# Patient Record
Sex: FEMALE | Race: WHITE | NOT HISPANIC OR LATINO | URBAN - METROPOLITAN AREA
[De-identification: names, ages, dates, MRNs, and addresses within clinical notes are randomized per-mention and may not be internally consistent; named-entity substitution may affect disease eponyms.]

---

## 2017-07-14 ENCOUNTER — OFFICE VISIT - HEALTHEAST (OUTPATIENT)
Dept: MIDWIFE SERVICES | Facility: CLINIC | Age: 42
End: 2017-07-14

## 2017-07-14 DIAGNOSIS — R10.2 PELVIC PAIN: ICD-10-CM

## 2017-07-14 DIAGNOSIS — N81.6 RECTOCELE: ICD-10-CM

## 2017-07-14 ASSESSMENT — MIFFLIN-ST. JEOR: SCORE: 1358.49

## 2017-07-16 ENCOUNTER — COMMUNICATION - HEALTHEAST (OUTPATIENT)
Dept: MIDWIFE SERVICES | Facility: CLINIC | Age: 42
End: 2017-07-16

## 2017-08-15 ENCOUNTER — HOSPITAL ENCOUNTER (OUTPATIENT)
Dept: ULTRASOUND IMAGING | Facility: HOSPITAL | Age: 42
Discharge: HOME | End: 2017-08-15
Attending: ADVANCED PRACTICE MIDWIFE

## 2017-08-15 DIAGNOSIS — R10.2 PELVIC PAIN: ICD-10-CM

## 2017-08-16 ENCOUNTER — AMBULATORY - HEALTHEAST (OUTPATIENT)
Dept: OBGYN | Facility: CLINIC | Age: 42
End: 2017-08-16

## 2017-08-16 ENCOUNTER — COMMUNICATION - HEALTHEAST (OUTPATIENT)
Dept: OBGYN | Facility: CLINIC | Age: 42
End: 2017-08-16

## 2017-08-16 DIAGNOSIS — N81.6 RECTOCELE, FEMALE: ICD-10-CM

## 2017-08-24 ENCOUNTER — COMMUNICATION - HEALTHEAST (OUTPATIENT)
Dept: ADMINISTRATIVE | Facility: CLINIC | Age: 42
End: 2017-08-24

## 2017-08-26 ENCOUNTER — AMBULATORY - HEALTHEAST (OUTPATIENT)
Dept: OBGYN | Facility: CLINIC | Age: 42
End: 2017-08-26

## 2017-08-28 ENCOUNTER — RECORDS - HEALTHEAST (OUTPATIENT)
Dept: ADMINISTRATIVE | Facility: OTHER | Age: 42
End: 2017-08-28

## 2017-11-16 ENCOUNTER — AMBULATORY - HEALTHEAST (OUTPATIENT)
Dept: MIDWIFE SERVICES | Facility: CLINIC | Age: 42
End: 2017-11-16

## 2017-11-28 ENCOUNTER — AMBULATORY - HEALTHEAST (OUTPATIENT)
Dept: CARDIOLOGY | Facility: CLINIC | Age: 42
End: 2017-11-28

## 2017-11-28 ENCOUNTER — OFFICE VISIT - HEALTHEAST (OUTPATIENT)
Dept: CARDIOLOGY | Facility: CLINIC | Age: 42
End: 2017-11-28

## 2017-11-28 ENCOUNTER — RECORDS - HEALTHEAST (OUTPATIENT)
Dept: ADMINISTRATIVE | Facility: OTHER | Age: 42
End: 2017-11-28

## 2017-11-28 DIAGNOSIS — I49.3 PVC'S (PREMATURE VENTRICULAR CONTRACTIONS): ICD-10-CM

## 2017-11-28 DIAGNOSIS — R07.2 PRECORDIAL PAIN: ICD-10-CM

## 2017-11-28 ASSESSMENT — MIFFLIN-ST. JEOR: SCORE: 1387.97

## 2017-12-04 ENCOUNTER — HOSPITAL ENCOUNTER (OUTPATIENT)
Dept: CARDIOLOGY | Facility: HOSPITAL | Age: 42
Discharge: HOME | End: 2017-12-04
Attending: INTERNAL MEDICINE

## 2017-12-04 DIAGNOSIS — I49.3 PVC'S (PREMATURE VENTRICULAR CONTRACTIONS): ICD-10-CM

## 2017-12-04 DIAGNOSIS — R07.2 PRECORDIAL PAIN: ICD-10-CM

## 2017-12-04 LAB
CV STRESS MAX HR HE: 175
STRESS ECHO BASELINE BP: NORMAL MM OF HG
STRESS ECHO BASELINE HR: 91 BPM
STRESS ECHO CALCULATED PERCENT HR: 98 %
STRESS ECHO LAST STRESS BP: NORMAL MM OF HG
STRESS ECHO POST ESTIMATED WORKLOAD: 11.9 METS
STRESS ECHO POST EXERCISE DUR MIN: 10 MIN
STRESS ECHO POST EXERCISE DUR SEC: 15 SEC
STRESS ECHO TARGET HR: 152

## 2017-12-27 ENCOUNTER — OFFICE VISIT - HEALTHEAST (OUTPATIENT)
Dept: CARDIOLOGY | Facility: CLINIC | Age: 42
End: 2017-12-27

## 2017-12-27 DIAGNOSIS — I49.1 PAC (PREMATURE ATRIAL CONTRACTION): ICD-10-CM

## 2017-12-27 ASSESSMENT — MIFFLIN-ST. JEOR: SCORE: 1399.54

## 2021-05-29 ENCOUNTER — RECORDS - HEALTHEAST (OUTPATIENT)
Dept: ADMINISTRATIVE | Facility: CLINIC | Age: 46
End: 2021-05-29

## 2021-05-31 ENCOUNTER — RECORDS - HEALTHEAST (OUTPATIENT)
Dept: ADMINISTRATIVE | Facility: CLINIC | Age: 46
End: 2021-05-31

## 2021-05-31 VITALS — HEIGHT: 64 IN | WEIGHT: 159.5 LBS | BODY MASS INDEX: 27.23 KG/M2

## 2021-05-31 VITALS — WEIGHT: 166 LBS | BODY MASS INDEX: 28.34 KG/M2 | HEIGHT: 64 IN

## 2021-05-31 VITALS — WEIGHT: 166.8 LBS | BODY MASS INDEX: 27.79 KG/M2 | HEIGHT: 65 IN

## 2021-06-02 ENCOUNTER — RECORDS - HEALTHEAST (OUTPATIENT)
Dept: ADMINISTRATIVE | Facility: CLINIC | Age: 46
End: 2021-06-02

## 2021-06-11 NOTE — PROGRESS NOTES
"Subjective: Elo presents to clinic by herself today.  She reports that 2 weeks ago after having sex she experienced pelvic pain approximately 15 minutes after coitis and was unable to walk afterward.  This resolved but twinges of bilateral pelvic pain were present and radiated down both legs x 1 week.  She states she has now been sx free x 2-3 days.  She has not had intercourse since.  She denies bleeding with the pelvic pain.  She denies vaginal dryness or discharge and/or foul odor.  She and her  are in a monogamous relationship and they use natural family planning and condoms for contraception.  Her cycles are regular though she reports she had one day of red spotted bleeding in the middle of her cycle this past month.  Of note she states she can't wear a tampon due to discomfort.  She reports she feels \"they do not fit right\".  She also states she feels something in her lower vagina when she touches herself.  She does not have the sensation that something is coming out of her vagina or is in between her legs.      Objective:  /78 (Patient Site: Right Arm, Patient Position: Sitting, Cuff Size: Adult Regular)  Pulse 80  Resp 20  Ht 5' 4\" (1.626 m)  Wt 159 lb 8 oz (72.3 kg)  LMP 2017 (Exact Date)  Breastfeeding? No  BMI 27.38 kg/m2     Patient appears well and is in no apparent distress.  Alert and oriented ×3.    Pelvic exam: Vulva without lesions, erythema, or open areas.  Vaginal mucosa pink and rugated with apparent bulge of vaginal floor.  Cervix pink and smooth without polyps.  No abnormal vaginal discharge or foul odor present.    No pelvic masses or CMT on bimanual exam.     Labs from 17:  UPT-negative  Wet prep-Negative  GC/CT-In process  UA/UC-Both negative  Pelvic u/s-Patient to schedule      Assessment:  40 yo   Pelvic pain following intercourse x 1.5 weeks  Rectocele      Plan:  UPT  Wet prep  GC/CT  UA/UC  Pelvic u/s  Referral to pelvic floor PT.  Provided " the name and phone number for Beverly Perez, PT ([phone])  Referral placed for PT to [facility] in [place], [place] as well.  Patient to determine which service in more cost effective for her.   Consider referral to OB-GYN in the case that ordered labs and u/s are unable to reveal the source of her pain  Follow up with patient and complete plan of care following results of pelvic u/s and GC/CT    Total time spent with patient 30 minutes.  >50% of the time spent counseling and coordinating care.    Rich MENDEZ CNM    7/16/2017  9:36 AM      The following note was sent via [employer] to the patient on 7/16/17 to clarify the plan:      Kyle Gasca,  It was great to meet you on Friday.  Here is what we have for a plan so far:    All labs have been negative with the exception of GC/CT which takes longer to get back.  I will let you know when these results are back.      A pelvic ultrasound has been ordered for you.  Please call [phone] to schedule this appointment.    Referral to pelvic floor PT.  I provided the name and phone number for Beverly Perez, PT ([phone]) to you last Friday and also placed a referral for PT to [facility] in [place], [place] as well.  They also have pelvic floor PT available.  You can determine which service you prefer/which is covered by you plan/ which is most cost effective.     In the case that we are unable to determine the source of your pain I recommend referring you to Jania Shaver MD for further care.    Let me know if you have any questions or concerns.    Take care,  ANDREA Carpenter CNM

## 2021-06-12 NOTE — PROGRESS NOTES
Phone call returned 8/25/17: Elo states that she had a recent pelvic US on 8/15/17 and she has had some vaginal itching and irritation since that time. When asked, stated that no covering was placed on vaginal probe. Discussed she can come in for an office visit or try OTC Monistat 3 or Monistat 7. Be sure to come in for office visit if above symptoms are unimproved after Monistat use. Elo agrees with this plan.  ANDREA Duffy,PATSYM

## 2021-06-12 NOTE — PROGRESS NOTES
Orders: Pt requests order to preferred physical therapist. Completed order and routed to Clinical Assistant to fax.

## 2021-06-16 PROBLEM — R10.2 PELVIC PAIN: Status: ACTIVE | Noted: 2017-07-14

## 2021-06-16 PROBLEM — N81.6 RECTOCELE: Status: ACTIVE | Noted: 2017-07-16

## 2021-06-25 NOTE — PROGRESS NOTES
Progress Notes by Mylene Sher MD at 11/28/2017  3:30 PM     Author: Mylene Sher MD Service: -- Author Type: Physician    Filed: 11/28/2017  4:00 PM Encounter Date: 11/28/2017 Status: Signed    : Mylene Sher MD (Physician)                [facility].[email or web address]  [phone]          Thank you Dr. Leahy for asking the [facility] Heart Care team to participate in the care of your patient, Elo Grayson.     Impression and Plan     1.  Palpitations/arrhythmia.  Patient in the distant past he has been noted to have evidence of rare PVCs/PACs.  Patients description of her palpitations are consistent with PVCs/PACs.  Thyroid panel already ordered by primary provider.  Plan:    Plan to perform a two-week 1 patch monitor to further clarify rhythm disturbance.    We will obtain a stress echocardiogram particularly given her palpitations and part seemed to be exacerbated with activity and this will also allow for us to rule out any structural heart disease and also to assess for ischemia vis-à-vis her chest pain symptoms though my level of suspicion that her symptoms are ischemic is relatively low given her lack of risk factors and the like.           History of Present Illness    Once again I would like to thank you again for asking me to participate in the care of your patient, Elo Grayson.  As you know, but to reiterate for my own records, Elo Grayson is a 42 y.o. female with long history of palpitations.  Patient states over the past several months, however, she has noted an increase in her symptoms.  She describes a sensation of an irregular heartbeat with frequent skipping.  Her symptoms can sometimes varying intensity.  At times she can go a day or 2 without any symptoms though at other times it seems to be more frequent.  With the palpitations themselves, she denies any other significant associated symptoms.  She does feel as though they may be related to  "exertion, however.    In addition to the aforementioned, she has noted some discomfort in the chest with certain activities.  She recounts an episode where she was racing whenever children and noted some discomfort in her left shoulder which radiated down into her arm.  She did have her associated palpitations with this.    Further review of systems is otherwise negative/noncontributory (medical record and 13 point review of systems reviewed as well and pertinent positives noted).         Cardiac Diagnostics     Echocardiogram 27 December 2001:  1. Normal left ventricle size and systolic performance with ejection fraction of 65%.  2. No significant valvular heart disease.  3. Normal right ventricular size and systolic performance.  4. Normal atrial dimensions.    Holter monitor 27 December 2001:  1. Sinus rhythm with normal electrocardiographic intervals.  2. Average heart rate of 80 bpm.  No excessive bradycardia, tachycardia, or heart block identified.  3. Rare ectopy consisting of 4 single PVCs and 165 single PACs.  Symptoms of palpitations noted on several occasions and may correlate to singular ectopic beats, but no worrisome or repetitive ectopic beats identified.           Physical Examination       /76 (Patient Site: Right Arm, Patient Position: Sitting, Cuff Size: Adult Large)  Pulse 86  Resp 16  Ht 5' 4\" (1.626 m)  Wt 166 lb (75.3 kg) Comment: with shoes  BMI 28.49 kg/m2        Wt Readings from Last 3 Encounters:   11/28/17 166 lb (75.3 kg)   07/14/17 159 lb 8 oz (72.3 kg)   08/29/16 168 lb 3.2 oz (76.3 kg)     The patient is alert and oriented times three. Sclerae are anicteric. Mucosal membranes are moist. Jugular venous pressure is normal. No significant adenopathy/thyromegally appreciated. Lungs are clear with good expansion. On cardiovascular exam, the patient has a regular S1 and S2. Abdomen is soft and non-tender. Extremities reveal no clubbing, cyanosis, or edema.         Family " History/Social History/Risk Factors     Patient has never been a smoker.  She reports no early onset coronary disease in her immediate family.  She has 4 children and enjoys spending time with her family.         Medications  Allergies   Current Outpatient Prescriptions   Medication Sig Dispense Refill   ? cholecalciferol, vitamin D3, (VITAMIN D3) 2,000 unit cap Take 2 capsules by mouth daily.     ? COQ10, UBIQUINOL, ORAL Take by mouth.     ? DOCOSAHEXANOIC ACID/EPA (FISH OIL ORAL) CAPS     ? LACTOBACILLUS ACIDOPHILUS (PROBIOTIC ORAL) CAPS     ? MULTIVITAMIN ORAL Take by mouth.       No current facility-administered medications for this visit.       No Known Allergies       Lab Results     Lab Results   Component Value Date    WBC 7.9 06/18/2012    HGB 12.1 12/23/2012    HCT 39.3 06/18/2012    MCV 92 06/18/2012     06/18/2012     Lab Results   Component Value Date    CHOL 208 (H) 04/30/2015    TRIG 89 04/30/2015    HDL 50 04/30/2015    LDLCALC 140 (H) 04/30/2015     Lab Results   Component Value Date    TSH 1.66 03/10/2016           Medical History  Surgical History   Past Medical History:   Diagnosis Date   ? Foot fracture, left    ? Mild anxiety     no meds   ? PVC (premature ventricular contraction) 4/30/2015   ? Urinary tract infection     15 years ago   ? Varicella     age 11   ? Varicosities    ? Varicosities of leg     Right leg      Past Surgical History:   Procedure Laterality Date   ? BREAST BIOPSY Left     benign   ? WISDOM TOOTH EXTRACTION  1995   ? WISDOM TOOTH EXTRACTION  1994

## 2021-06-26 NOTE — PROGRESS NOTES
Progress Notes by Mylene Sher MD at 12/27/2017  8:50 AM     Author: Mylene Sher MD Service: -- Author Type: Physician    Filed: 12/27/2017  9:29 AM Encounter Date: 12/27/2017 Status: Signed    : Mylene Sher MD (Physician)                  [facility].[email or web address]  [phone]         Thank you Dr. Leahy for asking the [facility] Heart Care team to participate in the care of your patient, Elo Grayson.     Impression and Plan     1.   Premature atrial contractions.  Recent one patch monitor revealed evidence of premature atrial contractions.  These retching quite rare.  These did not correlate with her symptoms.  At this time, I do not feel any specific further workup or treatment is required.  I felt simple reassurance was all that is required.  Patient was actually quite pleased with our discussion today in a person reviewed her rhythm strips with her.  At this time, we will simply plan on following up on an as-needed basis.           History of Present Illness    Once again I would like to thank you again for asking me to participate in the care of your patient, Elo Grayson.  As you know, but to reiterate for my own records, Elo Grayson is a 42 y.o. female with long history of palpitations.  Patient is here today for follow-up of recent one patch monitor placement.  On interview, patient reports some continued intermittent palpitations which she describes as skipping.  She otherwise is without significant symptoms.  She specifically denies any chest pain at this point.  She reports no shortness of breath or lightheadedness.  She denies any decline in exercise tolerance.    Further review of systems is otherwise negative/noncontributory (medical record and 13 point review of systems reviewed as well and pertinent positives noted).         Cardiac Diagnostics      Echocardiogram 27 December 2001:  1. Normal left ventricle size and systolic performance  "with ejection fraction of 65%.  2. No significant valvular heart disease.  3. Normal right ventricular size and systolic performance.  4. Normal atrial dimensions.    Normal stress echocardiogram without evidence of stress induced ischemia.   1. Normal resting LV systolic performance with an ejection fraction of 55-60%. There is normal improvement in left ventricular systolic performance with a peak ejection fraction of 70-75%.  2. No ECG evidence of ischemia.  3. No anginal chest pain reported with exercise.  4. Good functional capacity for age.  5. Stress echocardiogram for December 2017 (personally reviewed):    Holter monitor 27 December 2001:  1. Sinus rhythm with normal electrocardiographic intervals.  2. Average heart rate of 80 bpm.  No excessive bradycardia, tachycardia, or heart block identified.  3. Rare ectopy consisting of 4 single PVCs and 165 single PACs.  Symptoms of palpitations noted on several occasions and may correlate to singular ectopic beats, but no worrisome or repetitive ectopic beats identified.         Physical Examination       /86 (Patient Site: Left Arm, Patient Position: Sitting, Cuff Size: Adult Regular)  Pulse 80  Resp 16  Ht 5' 4.5\" (1.638 m) Comment: shoes on  Wt 166 lb 12.8 oz (75.7 kg) Comment: shoes on  BMI 28.19 kg/m2        Wt Readings from Last 3 Encounters:   12/27/17 166 lb 12.8 oz (75.7 kg)   11/28/17 166 lb (75.3 kg)   07/14/17 159 lb 8 oz (72.3 kg)     The patient is alert and oriented times three. Sclerae are anicteric. Mucosal membranes are moist. Jugular venous pressure is normal. No significant adenopathy/thyromegally appreciated. Lungs are clear with good expansion. On cardiovascular exam, the patient has a regular S1 and S2. Abdomen is soft and non-tender. Extremities reveal no clubbing, cyanosis, or edema.         Family History/Social History/Risk Factors   Patient does not smoke.  Family history of hypertension.         Medications  Allergies "   Current Outpatient Prescriptions   Medication Sig Dispense Refill   ? cholecalciferol, vitamin D3, (VITAMIN D3) 2,000 unit cap Take 2,000 Units by mouth daily.      ? COQ10, UBIQUINOL, ORAL Take 1 capsule by mouth daily.      ? DOCOSAHEXANOIC ACID/EPA (FISH OIL ORAL) Take 1 capsule by mouth daily. CAPS      ? LACTOBACILLUS ACIDOPHILUS (PROBIOTIC ORAL) Take 1 capsule by mouth daily. CAPS        No current facility-administered medications for this visit.       No Known Allergies       Lab Results     Lab Results   Component Value Date    WBC 7.9 06/18/2012    HGB 12.1 12/23/2012    HCT 39.3 06/18/2012    MCV 92 06/18/2012     06/18/2012     Lab Results   Component Value Date    CHOL 208 (H) 04/30/2015    TRIG 89 04/30/2015    HDL 50 04/30/2015    LDLCALC 140 (H) 04/30/2015     Lab Results   Component Value Date    TSH 1.66 03/10/2016

## 2024-03-04 ENCOUNTER — TRANSFERRED RECORDS (OUTPATIENT)
Dept: HEALTH INFORMATION MANAGEMENT | Facility: CLINIC | Age: 49
End: 2024-03-04